# Patient Record
(demographics unavailable — no encounter records)

---

## 2025-05-06 NOTE — ASSESSMENT
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  Today, Herminio is a well-appearing male who is at the % for height, % for weight, and % for BMI. Growth velocity since last visit of cm/yr. He is tolerating growth hormone without difficulties.  Plan - Labs today: IGF1, IGFBP3, TSH, Free T4 - Continue growth hormone 2.4 mg x6 days/week (0. mg/kg/w) - Repeat bone age - Follow-up in 4 months

## 2025-05-06 NOTE — ASSESSMENT
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  Today, Herminio is a well-appearing male who is at the % for height, % for weight, and % for BMI. Growth velocity since last visit of cm/yr. He is tolerating growth hormone without difficulties.  Plan - Labs today: IGF1, IGFBP3, TSH, Free T4 - Continue growth hormone 2.4 mg x6 days/week (0. mg/kg/w) - Repeat bone age - Follow-up in 4 months

## 2025-05-13 NOTE — HISTORY OF PRESENT ILLNESS
[FreeTextEntry2] : Herminio is a 14y old male with ASD, short stature, and partial GHD on GH therapy.   Bone age XR performed in 8/18 showed a bone age of 7 years 6 months at chronological age of 8 years old. Patient had GH stimulation test with peak of 2.8 (normal >10) indicating GH deficiency. Patient had MRI that showed normal pituitary gland. Patient started growth hormone in 2/2019 at 1.2 mg 6 x/week of growth hormone. He was last seen by Carleen Boswell NP, in May 2024.   In the interim, Herminio has been well. He takes Omnitrope 2.6 mg x6 days/week with 1-2 missed dosages per week. Mom and dad provide the injections in his thighs. He has no headaches, vision issues, constipation, diarrhea, polyuria, polydipsia.

## 2025-05-13 NOTE — CONSULT LETTER
[Dear  ___] : Dear  [unfilled], [Courtesy Letter:] : I had the pleasure of seeing your patient, [unfilled], in my office today. [Please see my note below.] : Please see my note below. [Consult Closing:] : Thank you very much for allowing me to participate in the care of this patient.  If you have any questions, please do not hesitate to contact me. [Sincerely,] : Sincerely, [FreeTextEntry3] : Jed Sher D.O.  for Pediatric Endocrinology Fellowship Residency Clerkship Director for Division  of Pediatric Endocrinology [facility]

## 2025-05-13 NOTE — CONSULT LETTER
[Dear  ___] : Dear  [unfilled], [Courtesy Letter:] : I had the pleasure of seeing your patient, [unfilled], in my office today. [Please see my note below.] : Please see my note below. [Consult Closing:] : Thank you very much for allowing me to participate in the care of this patient.  If you have any questions, please do not hesitate to contact me. [Sincerely,] : Sincerely, [FreeTextEntry3] : Jed Sher D.O.  for Pediatric Endocrinology Fellowship Residency Clerkship Director for Division  of Pediatric Endocrinology [facility]

## 2025-05-13 NOTE — CONSULT LETTER
[Dear  ___] : Dear  [unfilled], [Courtesy Letter:] : I had the pleasure of seeing your patient, [unfilled], in my office today. [Please see my note below.] : Please see my note below. [Consult Closing:] : Thank you very much for allowing me to participate in the care of this patient.  If you have any questions, please do not hesitate to contact me. [Sincerely,] : Sincerely, [FreeTextEntry3] : Jed Sher D.O.  for Pediatric Endocrinology Fellowship Residency Clerkship Director for Division  of Pediatric Endocrinology [facility]